# Patient Record
Sex: MALE | Race: OTHER | NOT HISPANIC OR LATINO
[De-identification: names, ages, dates, MRNs, and addresses within clinical notes are randomized per-mention and may not be internally consistent; named-entity substitution may affect disease eponyms.]

---
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
## 2021-12-20 ENCOUNTER — RESULT REVIEW (OUTPATIENT)
Age: 21
End: 2021-12-20

## 2022-03-25 ENCOUNTER — APPOINTMENT (OUTPATIENT)
Dept: ENDOCRINOLOGY | Facility: CLINIC | Age: 22
End: 2022-03-25

## 2022-05-23 ENCOUNTER — APPOINTMENT (OUTPATIENT)
Dept: UROLOGY | Facility: CLINIC | Age: 22
End: 2022-05-23

## 2022-11-07 ENCOUNTER — NON-APPOINTMENT (OUTPATIENT)
Age: 22
End: 2022-11-07

## 2022-11-07 ENCOUNTER — APPOINTMENT (OUTPATIENT)
Dept: OTOLARYNGOLOGY | Facility: CLINIC | Age: 22
End: 2022-11-07

## 2022-11-07 PROCEDURE — 92523 SPEECH SOUND LANG COMPREHEN: CPT | Mod: GN

## 2022-11-18 ENCOUNTER — APPOINTMENT (OUTPATIENT)
Dept: SPEECH THERAPY | Facility: CLINIC | Age: 22
End: 2022-11-18

## 2022-11-18 PROCEDURE — 92507 TX SP LANG VOICE COMM INDIV: CPT | Mod: GN

## 2022-11-21 ENCOUNTER — APPOINTMENT (OUTPATIENT)
Dept: SPEECH THERAPY | Facility: CLINIC | Age: 22
End: 2022-11-21

## 2022-12-02 ENCOUNTER — APPOINTMENT (OUTPATIENT)
Dept: SPEECH THERAPY | Facility: CLINIC | Age: 22
End: 2022-12-02

## 2022-12-29 ENCOUNTER — APPOINTMENT (OUTPATIENT)
Dept: PULMONOLOGY | Facility: CLINIC | Age: 22
End: 2022-12-29
Payer: MEDICAID

## 2022-12-29 VITALS
DIASTOLIC BLOOD PRESSURE: 81 MMHG | WEIGHT: 157 LBS | HEART RATE: 89 BPM | SYSTOLIC BLOOD PRESSURE: 118 MMHG | TEMPERATURE: 98 F | HEIGHT: 68 IN | BODY MASS INDEX: 23.79 KG/M2 | OXYGEN SATURATION: 97 %

## 2022-12-29 PROCEDURE — 99203 OFFICE O/P NEW LOW 30 MIN: CPT | Mod: 25

## 2022-12-29 PROCEDURE — 94729 DIFFUSING CAPACITY: CPT

## 2022-12-29 PROCEDURE — 94726 PLETHYSMOGRAPHY LUNG VOLUMES: CPT

## 2022-12-29 PROCEDURE — 94060 EVALUATION OF WHEEZING: CPT

## 2022-12-29 PROCEDURE — ZZZZZ: CPT

## 2022-12-29 RX ORDER — BUDESONIDE AND FORMOTEROL FUMARATE DIHYDRATE 160; 4.5 UG/1; UG/1
160-4.5 AEROSOL RESPIRATORY (INHALATION)
Refills: 0 | Status: ACTIVE | COMMUNITY

## 2023-01-06 ENCOUNTER — APPOINTMENT (OUTPATIENT)
Dept: PULMONOLOGY | Facility: CLINIC | Age: 23
End: 2023-01-06

## 2023-01-24 ENCOUNTER — APPOINTMENT (OUTPATIENT)
Dept: OTOLARYNGOLOGY | Facility: CLINIC | Age: 23
End: 2023-01-24

## 2023-02-02 ENCOUNTER — APPOINTMENT (OUTPATIENT)
Dept: OTOLARYNGOLOGY | Facility: CLINIC | Age: 23
End: 2023-02-02

## 2023-02-15 ENCOUNTER — APPOINTMENT (OUTPATIENT)
Dept: PULMONOLOGY | Facility: CLINIC | Age: 23
End: 2023-02-15

## 2023-04-13 ENCOUNTER — APPOINTMENT (OUTPATIENT)
Dept: PULMONOLOGY | Facility: CLINIC | Age: 23
End: 2023-04-13
Payer: MEDICAID

## 2023-04-13 DIAGNOSIS — Z20.822 CONTACT WITH AND (SUSPECTED) EXPOSURE TO COVID-19: ICD-10-CM

## 2023-04-13 LAB — SARS-COV-2 AG RESP QL IA.RAPID: NEGATIVE

## 2023-04-13 PROCEDURE — 94375 RESPIRATORY FLOW VOLUME LOOP: CPT

## 2023-04-13 PROCEDURE — 94621 CARDIOPULM EXERCISE TESTING: CPT

## 2023-04-13 PROCEDURE — 87811 SARS-COV-2 COVID19 W/OPTIC: CPT

## 2023-04-13 NOTE — HISTORY OF PRESENT ILLNESS
[TextBox_4] : 22-year-old male complaining of dyspnea at rest and on exertion. Patient reports that he had asthma as a child but there is no family history of atopic disease. He remembers using a nebulizer frequently and being in  emergency room frequently. He reports dyspnea while talking, while exercising and he suffers from insomnia brought on by thinking about his breathing pattern. He denies any chest pain or coughing. He was born in [facility] and came here at age 2. He is studying [employer] science and will graduate from [employer] this year and he has a good job lined up. He denies any other medical issues.\par He uses Symbicort daily for the last 2 years and occasional albuterol

## 2023-04-13 NOTE — PHYSICAL EXAM
[No Acute Distress] : no acute distress [Normal Oropharynx] : normal oropharynx [Normal Appearance] : normal appearance [No Neck Mass] : no neck mass [Normal Rate/Rhythm] : normal rate/rhythm [No Resp Distress] : no resp distress [Clear to Auscultation Bilaterally] : clear to auscultation bilaterally [Normal Gait] : normal gait [No Focal Deficits] : no focal deficits [TextBox_2] : Appears fit

## 2023-04-13 NOTE — REVIEW OF SYSTEMS
[Dyspnea] : dyspnea [Anxiety] : anxiety [Negative] : Neurologic [TextBox_14] : Deviated septum [TextBox_57] : Dust mites

## 2023-04-13 NOTE — ASSESSMENT
[FreeTextEntry1] : The patient's chest radiograph is normal. His lung functions show a mild reduction in vital capacity but overall a normal total lung capacity, normal flow rates are normal diffusing capacity. After bronchodilators he actually had a decline in volumes.\par Based on his history, it seems clear that the breathing has become a conscious effort with him and as a result he likely has a hyperventilation syndrome. I discussed that in detail with him. I gave him a peak flow meter. I encouraged him to continue exercising and he agrees to have a cardiopulmonary exercise test. I also suggested reducing his inhalers since I am not certain that he has asthma at all. I explained to him how to decelerate  these meds.

## 2023-04-15 ENCOUNTER — TRANSCRIPTION ENCOUNTER (OUTPATIENT)
Age: 23
End: 2023-04-15

## 2023-04-21 ENCOUNTER — APPOINTMENT (OUTPATIENT)
Dept: PULMONOLOGY | Facility: CLINIC | Age: 23
End: 2023-04-21

## 2023-04-28 ENCOUNTER — APPOINTMENT (OUTPATIENT)
Dept: PULMONOLOGY | Facility: CLINIC | Age: 23
End: 2023-04-28
Payer: MEDICAID

## 2023-04-28 DIAGNOSIS — R06.09 OTHER FORMS OF DYSPNEA: ICD-10-CM

## 2023-04-28 PROCEDURE — 99213 OFFICE O/P EST LOW 20 MIN: CPT | Mod: 95

## 2023-04-28 RX ORDER — FLUTICASONE PROPIONATE 110 UG/1
110 AEROSOL, METERED RESPIRATORY (INHALATION) TWICE DAILY
Qty: 1 | Refills: 3 | Status: ACTIVE | COMMUNITY
Start: 2023-04-28 | End: 1900-01-01

## 2023-04-28 NOTE — HISTORY OF PRESENT ILLNESS
[TextBox_4] : 22-year-old male with dyspnea at rest and on exertion here today to discuss his Cardiopulmonary exercise study. The patient has reported that he has asthma ever since he was a child and he had currently been taking Alvesco. We had demonstrated normal lung functionsAnd I had been concerned about him having a hyperventilation syndrome. I reviewed his exercise study with him. He was supposed to review it independently but did not. It appeared to show deconditioning with an abnormal finding in the inspiratory flows of his flow-volume loop demonstrating flattening suggesting laryngospasm or paradoxical vocal cord dysfunction. I discussed this with him in detail. I discussed with him the findings on his electrocardiogram.\par He insisted that he was getting tachycardias from Alvesco . I asked him to consult a cardiologist and to have a repeat electrocardiogram and echocardiogram and I asked him to see an otolaryngologist.\par I ordered Flovent for him instead of Alvesco.

## 2023-04-28 NOTE — REASON FOR VISIT
[Home] : at home, [unfilled] , at the time of the visit. [Medical Office: ([facility])___] : at the medical office located in  [Patient] : the patient [Self] : self

## 2023-04-28 NOTE — ASSESSMENT
[FreeTextEntry1] : As in my note above. I suggested a followup with cardiology because of his recurrent palpitations related to a drug it should not produce palpitations and a followup with otolaryngology because of the possibility of paradoxical vocal cord dysfunction. I also suggested him the possibility of a methacholine challenge and expiratory nitric oxide measurements. Methacholine is not currently available but  we will contact him when it is available

## 2023-05-24 ENCOUNTER — APPOINTMENT (OUTPATIENT)
Dept: CARDIOLOGY | Facility: HOSPITAL | Age: 23
End: 2023-05-24

## 2023-05-24 ENCOUNTER — NON-APPOINTMENT (OUTPATIENT)
Age: 23
End: 2023-05-24

## 2023-05-24 VITALS
OXYGEN SATURATION: 96 % | SYSTOLIC BLOOD PRESSURE: 116 MMHG | WEIGHT: 154 LBS | RESPIRATION RATE: 16 BRPM | HEART RATE: 70 BPM | TEMPERATURE: 98.3 F | DIASTOLIC BLOOD PRESSURE: 74 MMHG

## 2023-05-24 DIAGNOSIS — R07.89 OTHER CHEST PAIN: ICD-10-CM

## 2023-05-24 DIAGNOSIS — R00.2 PALPITATIONS: ICD-10-CM

## 2023-05-24 DIAGNOSIS — J98.01 ACUTE BRONCHOSPASM: ICD-10-CM

## 2023-05-24 NOTE — ASSESSMENT
[FreeTextEntry1] : 22-year-old man seen for shortness of breath and chest pain with exertion in addition to palpitations, which are possibly related to inhaled beta adrenergic-agonist medications. \par On exam, the chest is of normal configuration and clear to auscultation. There is no murmur, rub, or gallop.\par \par Impression:\par 1. Exertional chest pain with decreased exercise tolerance\par 2. Asthma with intermittent flares, not in exacerbation currently\par 3. ?Vocal cord dysfunction\par 4. Palpitations\par \par Plan:\par - Biotel MCOT monitor for palpitations\par - TTE for structural abn, was not able to appreciate NIDA with valsalva on my exam\par - Exercise ECG stress test \par - return to clinic after above.

## 2023-05-24 NOTE — REVIEW OF SYSTEMS
[SOB] : shortness of breath [Dyspnea on exertion] : dyspnea during exertion [Chest Discomfort] : chest discomfort [Palpitations] : palpitations [Fever] : no fever [Headache] : no headache [Weight Gain (___ Lbs)] : no recent weight gain [Chills] : no chills [Feeling Fatigued] : not feeling fatigued [Weight Loss (___ Lbs)] : no recent weight loss [Blurry Vision] : no blurred vision [Seeing Double (Diplopia)] : no diplopia [Eye Pain] : no eye pain [Earache] : no earache [Discharge From Ears] : no discharge from the ears [Hearing Loss] : no hearing loss [Mouth Sores] : no mouth sores [Sore Throat] : no sore throat [Sinus Pressure] : no sinus pressure [Tinnitus] : no tinnitus [Vertigo] : no vertigo [Lower Ext Edema] : no extremity edema [Leg Claudication] : no intermittent leg claudication [Orthopnea] : no orthopnea [PND] : no PND [Syncope] : no syncope [Cough] : no cough [Wheezing] : no wheezing [Coughing Up Blood] : no hemoptysis [Snoring] : no snoring [Abdominal Pain] : no abdominal pain [Nausea] : no nausea [Vomiting] : no vomiting [Heartburn] : no heartburn [Change in Appetite] : no change in appetite [Change In The Stool] : no change in stool [Dysphagia] : no dysphagia [Diarrhea] : diarrhea [Constipation] : no constipation [Blood in stool] : no blood in stoo [Urinary Frequency] : no change in urinary frequency [Erectile Dysfunction] : no erectile dysfunction [Joint Pain] : no joint pain [Joint Swelling] : no joint swelling [Joint Stiffness] : no joint stiffness [Muscle Cramps] : no muscle cramps [Myalgia] : no myalgia [Rash] : no rash [Itching] : no itching [Change In Color Of Skin] : change in skin color [Skin Lesions] : no skin lesions [Telangiectasias] : no telangiectasias [Dizziness] : no dizziness [Tremor] : no tremor was seen [Numbness (Hypoesthesia)] : no numbness [Convulsions] : no convulsions [Tingling (Paresthesia)] : no tingling [Weakness] : no weakness [Limb Weakness (Paresis)] : no limb weakness (Paresis) [Speech Disturbance] : no speech disturbance [Confusion] : no confusion was observed [Memory Lapses Or Loss] : no memory lapses or loss [Depression] : no depression [Anxiety] : no anxiety [Under Stress] : not under stress [Suicidal] : not suicidal [Easy Bleeding] : no tendency for easy bleeding [Swollen Glands] : no swollen glands [Easy Bruising] : no tendency for easy bruising

## 2023-05-24 NOTE — CARDIOLOGY SUMMARY
[de-identified] : sinus rhythm, NV 122ms  [de-identified] : CPET 4/13/2023\par Peak VO2 attained is low. This was achieved at low work load. Anaerobic threshold was borderline normal and breathing reserve was also normal. This indicated deconditioning as limitation to exercise. Please note that the exercise flow loops showed flattening of inspiratory limb of tidal flow loops which may suggest exercise induced laryngeal dysfunction \par \par PFT 12/29/22 mild reduction in vital capacity; normal expiratory flow rates; normal diffusing capacity. NO significant bronchodilator response

## 2023-05-24 NOTE — HISTORY OF PRESENT ILLNESS
[FreeTextEntry1] :  2000\par 22-year-old man complaining of dyspnea at rest and on exertion, evaluated recently by pulm. \par Abbreviated history:  asthma as a child but no family history of atopic disease. He remembers using a nebulizer frequently and being in emergency room frequently. He reports dyspnea while talking, while exercising and he suffers from insomnia brought on by thinking about his breathing pattern. He denies any chest pain or coughing.\par Interval history: normal lung functions. Pulm concerned about him having a hyperventilation syndrome. CPET showed impaired cardiorespiratory fitness (deconditioning) with an abnormal inspiratory flow pattern, suggesting laryngospasm or paradoxical vocal cord dysfunction. Pending otolaryngology evaluation \par Lastly, + Alvesco tachycardias? Switched to Flovent \par \par Regarding asthma, prior flares in childhood. Never intubations but was hospitalized for nebulizer treatments.\par Exertional chest pain since August of last year, now worsened. Can hardly run the distance of the court when playing basketball, when at his healthiest and most adjusted he has been able to run at least a mile. WIth dizziness associated as well, but never syncope. \par Decreased exercise tolerance affects his quality of life.\par He feels intermittent palpitations, which improved off Alvesco and prior Symbicort. Only receiving Flovent.\par \par FHx: no sudden cardiac death. HTN\par \par Social Hx:\par He was born in [facility] and came here at age 2. He is studying computer science and will graduate from [employer] this year and he has a good job lined up. He denies any other medical issues.\par Meds: He uses Symbicort daily for the last 2 years and occasional albuterol.

## 2023-05-24 NOTE — END OF VISIT
[] : Fellow [FreeTextEntry3] : Young man with asthma and vocal cord dysfunction referred for exertional chest pain, dyspnea, and palpitations. Will obtain ambulatory rhythm monitor, echo, and an treadmill exercise ECG test to screen for cardiac causes.

## 2023-05-24 NOTE — PHYSICAL EXAM
[Well Developed] : well developed [Well Nourished] : well nourished [No Acute Distress] : no acute distress [Normal Conjunctiva] : normal conjunctiva [Normal Venous Pressure] : normal venous pressure [No Carotid Bruit] : no carotid bruit [Normal S1, S2] : normal S1, S2 [No Murmur] : no murmur [Clear Lung Fields] : clear lung fields [Soft] : abdomen soft [Normal Gait] : normal gait [No Edema] : no edema [No Cyanosis] : no cyanosis [No Clubbing] : no clubbing [No Varicosities] : no varicosities [No Rash] : no rash [No Skin Lesions] : no skin lesions [Moves all extremities] : moves all extremities [Alert and Oriented] : alert and oriented

## 2023-05-25 ENCOUNTER — APPOINTMENT (OUTPATIENT)
Dept: PULMONOLOGY | Facility: CLINIC | Age: 23
End: 2023-05-25
Payer: MEDICAID

## 2023-05-25 VITALS
BODY MASS INDEX: 23.34 KG/M2 | OXYGEN SATURATION: 99 % | DIASTOLIC BLOOD PRESSURE: 74 MMHG | WEIGHT: 154 LBS | TEMPERATURE: 97.7 F | SYSTOLIC BLOOD PRESSURE: 131 MMHG | HEIGHT: 68 IN | HEART RATE: 60 BPM | RESPIRATION RATE: 14 BRPM

## 2023-05-25 DIAGNOSIS — J38.3 OTHER DISEASES OF VOCAL CORDS: ICD-10-CM

## 2023-05-25 LAB
25(OH)D3 SERPL-MCNC: 38.2 NG/ML
ALBUMIN SERPL ELPH-MCNC: 4.6 G/DL
ALP BLD-CCNC: 63 U/L
ALT SERPL-CCNC: 20 U/L
ANION GAP SERPL CALC-SCNC: 17 MMOL/L
AST SERPL-CCNC: 20 U/L
BILIRUB SERPL-MCNC: 0.3 MG/DL
BUN SERPL-MCNC: 14 MG/DL
CALCIUM SERPL-MCNC: 9.7 MG/DL
CHLORIDE SERPL-SCNC: 100 MMOL/L
CO2 SERPL-SCNC: 23 MMOL/L
CREAT SERPL-MCNC: 0.92 MG/DL
EGFR: 121 ML/MIN/1.73M2
GLUCOSE SERPL-MCNC: 45 MG/DL
POTASSIUM SERPL-SCNC: 4.1 MMOL/L
PROT SERPL-MCNC: 7.4 G/DL
SODIUM SERPL-SCNC: 140 MMOL/L

## 2023-05-25 PROCEDURE — 99213 OFFICE O/P EST LOW 20 MIN: CPT

## 2023-05-25 NOTE — ASSESSMENT
[FreeTextEntry1] : Prior to seeing me the patient believes that his dyspnea was related to nasal septal deviation which he had straightened. He also believes that his mandible is too small and he is exploring  mandibular expansion surgery. I tried to discourage that. I did encourage him to see an otolaryngologist to see if he has vocal cord dysfunction. I encouraged him to increase his exercise. At the present time we have no evidence that he has asthma.\par I reviewed his flow volume loops and his exercise study with him

## 2023-05-25 NOTE — HISTORY OF PRESENT ILLNESS
[TextBox_4] : 22-year-old male Complaining of dyspnea at rest and on exertion and blaming it on his asthma. He had issues with some of his bronchodilators as causing tachycardia and he is currently using Flovent. We tested his lung function and he had normal lumbar is a normal flow rates and no response to bronchodilators. His lung findings are slightly reduced but consistent with his ethnic background. He was  studied cardiopulmonary exercise test which demonstrated deconditioning but he had flattening of the inspiratory limb on his flow volume during the end of his exercise suggesting the possibility of exercise induced focal cord dysfunction. And based on his other symptoms which sounded very much like hyperventilation syndrome, I think that it is quite possible that he does have a cord dysfunction even sometimes at rest.

## 2023-06-15 ENCOUNTER — APPOINTMENT (OUTPATIENT)
Dept: PULMONOLOGY | Facility: CLINIC | Age: 23
End: 2023-06-15

## 2023-07-05 ENCOUNTER — NON-APPOINTMENT (OUTPATIENT)
Age: 23
End: 2023-07-05

## 2023-07-05 ENCOUNTER — APPOINTMENT (OUTPATIENT)
Dept: OTOLARYNGOLOGY | Facility: CLINIC | Age: 23
End: 2023-07-05
Payer: MEDICAID

## 2023-07-05 VITALS
DIASTOLIC BLOOD PRESSURE: 77 MMHG | HEART RATE: 60 BPM | HEIGHT: 68 IN | WEIGHT: 154 LBS | SYSTOLIC BLOOD PRESSURE: 132 MMHG | TEMPERATURE: 97.2 F | BODY MASS INDEX: 23.34 KG/M2

## 2023-07-05 DIAGNOSIS — J34.89 OTHER SPECIFIED DISORDERS OF NOSE AND NASAL SINUSES: ICD-10-CM

## 2023-07-05 DIAGNOSIS — J34.2 DEVIATED NASAL SEPTUM: ICD-10-CM

## 2023-07-05 PROCEDURE — 31231 NASAL ENDOSCOPY DX: CPT

## 2023-07-05 PROCEDURE — 99204 OFFICE O/P NEW MOD 45 MIN: CPT | Mod: 25

## 2023-07-05 RX ORDER — FLUTICASONE PROPIONATE 50 UG/1
50 SPRAY, METERED NASAL DAILY
Qty: 1 | Refills: 2 | Status: ACTIVE | COMMUNITY
Start: 2023-07-05 | End: 1900-01-01

## 2023-07-06 NOTE — ASSESSMENT
[FreeTextEntry1] : Mr. HOUSTON is a 22 year male s/p septo, nvr, bitr 2/2023 with return of nasal obstruction. On exam he has caudal septal deflection to the Right as well as nasal valve collapse + petra bilateral \par \par - rx Flonase nasal spray use 1 spray each nostril daily for 4-6 weeks, advised it takes time to start working \par - educated on appropriate use including daily use and spraying left nostril with right hand and vice versa\par \par - d/w patient r/b/a of Open revision septoplastyR caudal septal recon  / nvr with bilateral spreaders\par - d/w patient possibility of intranasal splint for 1 week\par - postoperative instructions were discussed with the patient including no aerobic exercise for 2 weeks, no heavy lifting for 2 weeks\par - risks of bleeding and septal perforation were discussed and all questions answered\par - photos taken today at 600\par - cosmetic changes and fees discussed\par - will return for simulation appt at some point, but would prefer to do surgery after palate expander and I agree \par \par Extensive discussion had regarding r/b/a of above procedure. all questions were answered\par will get CT scan prior to surgery due to previous surgery and pt preference \par \par \par

## 2023-07-06 NOTE — PHYSICAL EXAM
[Nasal Endoscopy Performed] : nasal endoscopy was performed, see procedure section for findings [] : septum deviated to the right [Normal] : no abnormal secretions [de-identified] : asymmetry, deflection right caudal septum  [de-identified] : nasal valve collapse + petra bilateral

## 2023-07-06 NOTE — PROCEDURE
[FreeTextEntry6] : reason for exam: anterior rhinoscopy insufficient for symptom evaluation \par \par Fiberoptic nasal endoscopy was performed.  R/b/a of procedure was explained to the patient and they agreed to proceed with procedure.    normal mucosa, normal b/l inferior, middle and superior turbinates, inferior, middle and superior meati and sphenoethmoidal recess.  No nasal polyps. CAUDAL SEPTAL DEFLECTION RIGHT\par \par scope #: 25\par \par

## 2023-07-06 NOTE — HISTORY OF PRESENT ILLNESS
[de-identified] : Mr. HOUSTON is a 22 year male pt s/p septo turbs repair nvs with Dr. Mathur 2/10/23, prior to surgery he had R sided nasal obstruction, post op he felt he was breathing better and now feels R sided obstruction is returned, uses Flonase on occasion. also with collapse of L nostril when breathing\par - pt does have allergies\par - denies nasal trauma\par - starting palate expansion in September

## 2023-07-06 NOTE — END OF VISIT
[FreeTextEntry3] : I personally saw and examined JERRY HOUSTON in detail.  I spoke to DENAE Abbott regarding the assessment and plan of care. I performed the procedures and relevant physical exam.  I have reviewed the above assessment and plan of care and I agree.  I have made changes to the body of the note wherever necessary and appropriate.\par

## 2023-07-06 NOTE — CONSULT LETTER
[Dear  ___] : Dear  [unfilled], [Consult Letter:] : I had the pleasure of evaluating your patient, [unfilled]. [Sincerely,] : Sincerely, [FreeTextEntry3] : Reema Elizondo MD\par Otolaryngology- Facial Plastics \par [address]\[place] [address]\par (P) - [phone]\par (F) - [phone]\par

## 2023-07-17 ENCOUNTER — APPOINTMENT (OUTPATIENT)
Dept: CT IMAGING | Facility: CLINIC | Age: 23
End: 2023-07-17

## 2023-07-28 DIAGNOSIS — R06.83 SNORING: ICD-10-CM

## 2023-08-09 ENCOUNTER — APPOINTMENT (OUTPATIENT)
Dept: INTERNAL MEDICINE | Facility: CLINIC | Age: 23
End: 2023-08-09

## 2023-10-13 ENCOUNTER — APPOINTMENT (OUTPATIENT)
Age: 23
End: 2023-10-13
Payer: COMMERCIAL

## 2023-10-13 PROCEDURE — 99024 POSTOP FOLLOW-UP VISIT: CPT

## 2023-11-02 ENCOUNTER — APPOINTMENT (OUTPATIENT)
Dept: SLEEP CENTER | Facility: CLINIC | Age: 23
End: 2023-11-02
Payer: MEDICAID

## 2023-11-02 ENCOUNTER — OUTPATIENT (OUTPATIENT)
Dept: OUTPATIENT SERVICES | Facility: HOSPITAL | Age: 23
LOS: 1 days | End: 2023-11-02
Payer: MEDICAID

## 2023-11-02 PROCEDURE — 95810 POLYSOM 6/> YRS 4/> PARAM: CPT | Mod: 26

## 2023-11-02 PROCEDURE — 95810 POLYSOM 6/> YRS 4/> PARAM: CPT

## 2023-11-09 DIAGNOSIS — G47.33 OBSTRUCTIVE SLEEP APNEA (ADULT) (PEDIATRIC): ICD-10-CM

## 2024-02-09 ENCOUNTER — APPOINTMENT (OUTPATIENT)
Age: 24
End: 2024-02-09